# Patient Record
(demographics unavailable — no encounter records)

---

## 2025-03-31 NOTE — PHYSICAL EXAM
[de-identified] : PHYSICAL EXAM LEFT KNEE  SWOLLEN NO FLUID TENDER ANTERIOR KNEE AROM EXTENSION = 0 FLEXION = 130   SPECIAL TESTS  PATELLAR GRIND = NEG DRAWER  = NEG LACHMAN = NEG MACMURRAY = NEG   MOTOR = GROSSLY INTACT SENSORY = GROSSLY INTACT    [de-identified] : I ORDERED XRAYS OF KNEE TO EVALUATE PAINFUL SYMPTOMS  XRAY LEFT KNEE: 4 views of the knee GRADE 1 OSTEOARTHRITIS No obvious fracture or dislocation. Alignment within normal limits   .XRAY LEFT ANKLE ( 3 VIEWS) NO FRACTURE  JOINT NORNAL  NO ANKLE
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
## 2025-03-31 NOTE — DISCUSSION/SUMMARY
[de-identified] : LEFT ANKLE SPRAIN 2  ALEVE , 2X DAY  ELASTIC SUPPORT FOR ACTIVITIES  PT 2 X 3 WEEKS - THEN HOME EXERCISES  LEFT ARTHRITIS  GRADE 1 HOME STRETCHING  CONSIDER KENALOG OR MONOVISC

## 2025-03-31 NOTE — PHYSICAL EXAM
[de-identified] : PHYSICAL EXAM LEFT KNEE  SWOLLEN NO FLUID TENDER ANTERIOR KNEE AROM EXTENSION = 0 FLEXION = 130   SPECIAL TESTS  PATELLAR GRIND = NEG DRAWER  = NEG LACHMAN = NEG MACMURRAY = NEG   MOTOR = GROSSLY INTACT SENSORY = GROSSLY INTACT    [de-identified] : I ORDERED XRAYS OF KNEE TO EVALUATE PAINFUL SYMPTOMS  XRAY LEFT KNEE: 4 views of the knee GRADE 1 OSTEOARTHRITIS No obvious fracture or dislocation. Alignment within normal limits   .XRAY LEFT ANKLE ( 3 VIEWS) NO FRACTURE  JOINT NORNAL  NO ANKLE

## 2025-03-31 NOTE — DISCUSSION/SUMMARY
[de-identified] : LEFT ANKLE SPRAIN 2  ALEVE , 2X DAY  ELASTIC SUPPORT FOR ACTIVITIES  PT 2 X 3 WEEKS - THEN HOME EXERCISES  LEFT ARTHRITIS  GRADE 1 HOME STRETCHING  CONSIDER KENALOG OR MONOVISC
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
